# Patient Record
Sex: FEMALE | Race: WHITE | NOT HISPANIC OR LATINO | Employment: OTHER
[De-identification: names, ages, dates, MRNs, and addresses within clinical notes are randomized per-mention and may not be internally consistent; named-entity substitution may affect disease eponyms.]

---

## 2021-06-17 ENCOUNTER — DASHBOARD ENCOUNTERS (OUTPATIENT)
Age: 81
End: 2021-06-17

## 2021-06-17 ENCOUNTER — OFFICE VISIT (OUTPATIENT)
Dept: URBAN - METROPOLITAN AREA CLINIC 33 | Facility: CLINIC | Age: 81
End: 2021-06-17

## 2021-06-17 VITALS
HEART RATE: 57 BPM | SYSTOLIC BLOOD PRESSURE: 108 MMHG | OXYGEN SATURATION: 97 % | WEIGHT: 139 LBS | HEIGHT: 60 IN | BODY MASS INDEX: 27.29 KG/M2 | DIASTOLIC BLOOD PRESSURE: 64 MMHG

## 2021-06-17 RX ORDER — SOD SULF/POT CHLORIDE/MAG SULF 1.479 G
AS DIRECTED TABLET ORAL BID
Qty: 1 KIT | Refills: 0 | OUTPATIENT
Start: 2021-06-17

## 2021-06-17 RX ORDER — OXAZEPAM 10 MG/1
1 CAPSULE AS NEEDED CAPSULE, GELATIN COATED ORAL THREE TIMES A DAY
Status: ACTIVE | COMMUNITY

## 2021-06-17 RX ORDER — CITALOPRAM HYDROBROMIDE 20 MG/1
1 TABLET TABLET, FILM COATED ORAL ONCE A DAY
Qty: 30 | Status: ACTIVE | COMMUNITY

## 2021-06-17 RX ORDER — GABAPENTIN 100 MG/1
1 CAPSULE CAPSULE ORAL ONCE A DAY
Qty: 30 | Status: ACTIVE | COMMUNITY

## 2021-06-17 RX ORDER — ONDANSETRON 4 MG/1
1 TABLET ON THE TONGUE AND ALLOW TO DISSOLVE TABLET, ORALLY DISINTEGRATING ORAL
Qty: 4 | Refills: 0 | OUTPATIENT
Start: 2021-06-17

## 2021-06-17 RX ORDER — HYDROCHLOROTHIAZIDE 25 MG/1
1 TABLET IN THE MORNING TABLET ORAL ONCE A DAY
Qty: 30 | Status: ACTIVE | COMMUNITY

## 2021-06-17 NOTE — HPI-MIGRATED HPI
;     Fecal incontinence : 80 year old female presents today for a consult for Fecal incontinence.  She has a h/o fecal incontinence since high school and over the years her symptoms have worsen especially within the last month. 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 She has tried hyoscyamine with no relief. The last occurrence of incontinence was yesterday. She states most of the times she doesn't feel her BM's or even when she has to have a BM. Patient has no control over passing gas, sometimes she doesn't know if its going to be gas or a BM. She does have fecal urgency at times. She tries to be careful of what she eats because nothing seems to help, diet or medication wise. Patient has had a rectal mesh placed in 1987 and removed in 2010/2011 due to infection. She has a hard time straining, she feels like she has to strain but just cant. Any artifical sweeteners are a trigger for her symptoms.   Currently has 1-2 bowel movements per day. Stools vary from watery to hard with strain, her stools are never consistent. No blood, mucus, or melena, pruritus ani, rectal pain.  Patient denies associated abdominal pains, bloating.  When she has a bowel movement she do not feel like she is completely emptying her bowels.  Her last colonoscopy was about 10-12 years ago.  She states had polyps removed.   GI MD Note: she has chronic constipation not having BM's for 4-5 days then will have loose stools with also pieces of hard stool and this is when she has incontinence. Emptying is incomplete. Incontinence happens approximately once every 2 weeks or so.;

## 2021-06-21 ENCOUNTER — TELEPHONE ENCOUNTER (OUTPATIENT)
Dept: URBAN - METROPOLITAN AREA CLINIC 35 | Facility: CLINIC | Age: 81
End: 2021-06-21

## 2021-07-06 ENCOUNTER — OFFICE VISIT (OUTPATIENT)
Dept: URBAN - METROPOLITAN AREA MEDICAL CENTER 10 | Facility: MEDICAL CENTER | Age: 81
End: 2021-07-06

## 2021-07-22 ENCOUNTER — OFFICE VISIT (OUTPATIENT)
Dept: URBAN - METROPOLITAN AREA CLINIC 33 | Facility: CLINIC | Age: 81
End: 2021-07-22

## 2021-08-08 PROBLEM — 72042002: Status: ACTIVE | Noted: 2021-06-17
